# Patient Record
Sex: FEMALE | Race: BLACK OR AFRICAN AMERICAN | URBAN - METROPOLITAN AREA
[De-identification: names, ages, dates, MRNs, and addresses within clinical notes are randomized per-mention and may not be internally consistent; named-entity substitution may affect disease eponyms.]

---

## 2017-01-09 RX ORDER — PRAVASTATIN SODIUM 40 MG
TABLET ORAL
Qty: 90 TABLET | Refills: 0 | Status: SHIPPED | OUTPATIENT
Start: 2017-01-09 | End: 2017-01-10 | Stop reason: SDUPTHER

## 2017-01-10 ENCOUNTER — OFFICE VISIT (OUTPATIENT)
Dept: INTERNAL MEDICINE CLINIC | Age: 82
End: 2017-01-10

## 2017-01-10 VITALS
TEMPERATURE: 98.1 F | BODY MASS INDEX: 31.56 KG/M2 | WEIGHT: 189.4 LBS | SYSTOLIC BLOOD PRESSURE: 148 MMHG | DIASTOLIC BLOOD PRESSURE: 68 MMHG | HEIGHT: 65 IN | HEART RATE: 64 BPM

## 2017-01-10 DIAGNOSIS — E21.3 HYPERPARATHYROIDISM (HCC): ICD-10-CM

## 2017-01-10 DIAGNOSIS — E78.5 HYPERLIPIDEMIA, UNSPECIFIED HYPERLIPIDEMIA TYPE: ICD-10-CM

## 2017-01-10 DIAGNOSIS — I10 ESSENTIAL HYPERTENSION: Primary | ICD-10-CM

## 2017-01-10 DIAGNOSIS — K63.5 COLON POLYP: ICD-10-CM

## 2017-01-10 PROCEDURE — 99213 OFFICE O/P EST LOW 20 MIN: CPT | Performed by: INTERNAL MEDICINE

## 2017-01-10 RX ORDER — PRAVASTATIN SODIUM 40 MG
40 TABLET ORAL DAILY
Qty: 90 TABLET | Refills: 2 | Status: SHIPPED | OUTPATIENT
Start: 2017-01-10 | End: 2017-09-26 | Stop reason: SDUPTHER

## 2017-01-10 RX ORDER — ALPRAZOLAM 0.5 MG/1
0.25 TABLET ORAL NIGHTLY PRN
Qty: 15 TABLET | Refills: 0 | Status: SHIPPED | OUTPATIENT
Start: 2017-01-10 | End: 2018-06-13 | Stop reason: SDUPTHER

## 2017-01-10 ASSESSMENT — ENCOUNTER SYMPTOMS
DIARRHEA: 0
ABDOMINAL DISTENTION: 0
ABDOMINAL PAIN: 0
COUGH: 0
CHEST TIGHTNESS: 0
CONSTIPATION: 0
WHEEZING: 0

## 2017-01-11 LAB
A/G RATIO: 1.2 (ref 1.1–2.2)
ALBUMIN SERPL-MCNC: 4.1 G/DL (ref 3.4–5)
ALP BLD-CCNC: 119 U/L (ref 40–129)
ALT SERPL-CCNC: 10 U/L (ref 10–40)
ANION GAP SERPL CALCULATED.3IONS-SCNC: 15 MMOL/L (ref 3–16)
AST SERPL-CCNC: 13 U/L (ref 15–37)
BILIRUB SERPL-MCNC: 0.4 MG/DL (ref 0–1)
BUN BLDV-MCNC: 19 MG/DL (ref 7–20)
CALCIUM SERPL-MCNC: 10.5 MG/DL (ref 8.3–10.6)
CHLORIDE BLD-SCNC: 102 MMOL/L (ref 99–110)
CHOLESTEROL, TOTAL: 212 MG/DL (ref 0–199)
CO2: 24 MMOL/L (ref 21–32)
CREAT SERPL-MCNC: 1 MG/DL (ref 0.6–1.2)
GFR AFRICAN AMERICAN: >60
GFR NON-AFRICAN AMERICAN: 53
GLOBULIN: 3.3 G/DL
GLUCOSE BLD-MCNC: 84 MG/DL (ref 70–99)
HDLC SERPL-MCNC: 78 MG/DL (ref 40–60)
LDL CHOLESTEROL CALCULATED: 117 MG/DL
PARATHYROID HORMONE INTACT: 121.5 PG/ML (ref 14–72)
POTASSIUM SERPL-SCNC: 4.9 MMOL/L (ref 3.5–5.1)
SODIUM BLD-SCNC: 141 MMOL/L (ref 136–145)
TOTAL PROTEIN: 7.4 G/DL (ref 6.4–8.2)
TRIGL SERPL-MCNC: 85 MG/DL (ref 0–150)
VLDLC SERPL CALC-MCNC: 17 MG/DL

## 2017-01-24 RX ORDER — OLMESARTAN MEDOXOMIL 40 MG/1
TABLET, FILM COATED ORAL
Qty: 90 TABLET | Refills: 0 | Status: SHIPPED | OUTPATIENT
Start: 2017-01-24 | End: 2017-04-17 | Stop reason: SDUPTHER

## 2017-02-14 RX ORDER — ATENOLOL 25 MG/1
TABLET ORAL
Qty: 180 TABLET | Refills: 0 | Status: SHIPPED | OUTPATIENT
Start: 2017-02-14 | End: 2017-05-16 | Stop reason: SDUPTHER

## 2017-04-17 RX ORDER — OLMESARTAN MEDOXOMIL 40 MG/1
TABLET, FILM COATED ORAL
Qty: 90 TABLET | Refills: 0 | Status: SHIPPED | OUTPATIENT
Start: 2017-04-17 | End: 2017-07-18 | Stop reason: SDUPTHER

## 2017-05-16 RX ORDER — ATENOLOL 25 MG/1
TABLET ORAL
Qty: 180 TABLET | Refills: 0 | Status: SHIPPED | OUTPATIENT
Start: 2017-05-16 | End: 2017-08-15 | Stop reason: SDUPTHER

## 2017-07-18 RX ORDER — OLMESARTAN MEDOXOMIL 40 MG/1
TABLET, FILM COATED ORAL
Qty: 90 TABLET | Refills: 0 | Status: SHIPPED | OUTPATIENT
Start: 2017-07-18 | End: 2017-10-10 | Stop reason: SDUPTHER

## 2017-08-15 ENCOUNTER — TELEPHONE (OUTPATIENT)
Dept: INTERNAL MEDICINE CLINIC | Age: 82
End: 2017-08-15

## 2017-08-15 RX ORDER — ATENOLOL 25 MG/1
TABLET ORAL
Qty: 180 TABLET | Refills: 0 | Status: SHIPPED | OUTPATIENT
Start: 2017-08-15 | End: 2018-05-07 | Stop reason: ALTCHOICE

## 2017-08-16 RX ORDER — METOPROLOL TARTRATE 50 MG/1
50 TABLET, FILM COATED ORAL 2 TIMES DAILY
Qty: 60 TABLET | Refills: 1 | Status: SHIPPED | OUTPATIENT
Start: 2017-08-16 | End: 2017-11-07 | Stop reason: SDUPTHER

## 2017-10-10 RX ORDER — OLMESARTAN MEDOXOMIL 40 MG/1
TABLET, FILM COATED ORAL
Qty: 90 TABLET | Refills: 0 | Status: SHIPPED | OUTPATIENT
Start: 2017-10-10 | End: 2018-01-08 | Stop reason: SDUPTHER

## 2017-10-24 RX ORDER — PRAVASTATIN SODIUM 40 MG
TABLET ORAL
Qty: 90 TABLET | Refills: 0 | Status: SHIPPED | OUTPATIENT
Start: 2017-10-24 | End: 2018-01-18 | Stop reason: SDUPTHER

## 2017-11-07 RX ORDER — METOPROLOL TARTRATE 50 MG/1
TABLET, FILM COATED ORAL
Qty: 60 TABLET | Refills: 0 | Status: SHIPPED | OUTPATIENT
Start: 2017-11-07 | End: 2017-12-05 | Stop reason: SDUPTHER

## 2017-12-05 RX ORDER — METOPROLOL TARTRATE 50 MG/1
TABLET, FILM COATED ORAL
Qty: 60 TABLET | Refills: 0 | Status: SHIPPED | OUTPATIENT
Start: 2017-12-05 | End: 2018-01-08 | Stop reason: SDUPTHER

## 2018-01-08 RX ORDER — METOPROLOL TARTRATE 50 MG/1
TABLET, FILM COATED ORAL
Qty: 60 TABLET | Refills: 0 | Status: SHIPPED | OUTPATIENT
Start: 2018-01-08 | End: 2018-02-07 | Stop reason: SDUPTHER

## 2018-01-18 RX ORDER — PRAVASTATIN SODIUM 40 MG
TABLET ORAL
Qty: 90 TABLET | Refills: 0 | Status: SHIPPED | OUTPATIENT
Start: 2018-01-18 | End: 2018-05-07 | Stop reason: SDUPTHER

## 2018-03-06 RX ORDER — METOPROLOL TARTRATE 50 MG/1
TABLET, FILM COATED ORAL
Qty: 60 TABLET | Refills: 2 | Status: SHIPPED | OUTPATIENT
Start: 2018-03-06 | End: 2018-05-07 | Stop reason: SDUPTHER

## 2018-04-10 RX ORDER — OLMESARTAN MEDOXOMIL 40 MG/1
TABLET, FILM COATED ORAL
Qty: 30 TABLET | Refills: 0 | Status: SHIPPED | OUTPATIENT
Start: 2018-04-10 | End: 2018-05-07 | Stop reason: SDUPTHER

## 2018-05-01 ENCOUNTER — TELEPHONE (OUTPATIENT)
Dept: INTERNAL MEDICINE CLINIC | Age: 83
End: 2018-05-01

## 2018-05-07 ENCOUNTER — OFFICE VISIT (OUTPATIENT)
Dept: INTERNAL MEDICINE CLINIC | Age: 83
End: 2018-05-07

## 2018-05-07 VITALS
DIASTOLIC BLOOD PRESSURE: 72 MMHG | TEMPERATURE: 98.1 F | HEART RATE: 67 BPM | BODY MASS INDEX: 32.26 KG/M2 | HEIGHT: 65 IN | SYSTOLIC BLOOD PRESSURE: 150 MMHG | WEIGHT: 193.6 LBS

## 2018-05-07 DIAGNOSIS — E21.3 HYPERPARATHYROIDISM (HCC): ICD-10-CM

## 2018-05-07 DIAGNOSIS — K63.5 POLYP OF COLON, UNSPECIFIED PART OF COLON, UNSPECIFIED TYPE: ICD-10-CM

## 2018-05-07 DIAGNOSIS — I10 ESSENTIAL HYPERTENSION: Primary | ICD-10-CM

## 2018-05-07 DIAGNOSIS — E78.5 HYPERLIPIDEMIA, UNSPECIFIED HYPERLIPIDEMIA TYPE: ICD-10-CM

## 2018-05-07 DIAGNOSIS — R07.89 CHEST DISCOMFORT: ICD-10-CM

## 2018-05-07 LAB
A/G RATIO: 1.1 (ref 1.1–2.2)
ALBUMIN SERPL-MCNC: 4.1 G/DL (ref 3.4–5)
ALP BLD-CCNC: 105 U/L (ref 40–129)
ALT SERPL-CCNC: 10 U/L (ref 10–40)
ANION GAP SERPL CALCULATED.3IONS-SCNC: 17 MMOL/L (ref 3–16)
AST SERPL-CCNC: 13 U/L (ref 15–37)
BASOPHILS ABSOLUTE: 0 K/UL (ref 0–0.2)
BASOPHILS RELATIVE PERCENT: 0.7 %
BILIRUB SERPL-MCNC: 0.4 MG/DL (ref 0–1)
BUN BLDV-MCNC: 17 MG/DL (ref 7–20)
CALCIUM SERPL-MCNC: 10 MG/DL (ref 8.3–10.6)
CHLORIDE BLD-SCNC: 104 MMOL/L (ref 99–110)
CHOLESTEROL, TOTAL: 193 MG/DL (ref 0–199)
CO2: 22 MMOL/L (ref 21–32)
CREAT SERPL-MCNC: 0.9 MG/DL (ref 0.6–1.2)
EOSINOPHILS ABSOLUTE: 0.3 K/UL (ref 0–0.6)
EOSINOPHILS RELATIVE PERCENT: 5.4 %
GFR AFRICAN AMERICAN: >60
GFR NON-AFRICAN AMERICAN: 59
GLOBULIN: 3.8 G/DL
GLUCOSE BLD-MCNC: 78 MG/DL (ref 70–99)
HCT VFR BLD CALC: 40.1 % (ref 36–48)
HDLC SERPL-MCNC: 65 MG/DL (ref 40–60)
HEMOGLOBIN: 13.3 G/DL (ref 12–16)
LDL CHOLESTEROL CALCULATED: 104 MG/DL
LYMPHOCYTES ABSOLUTE: 1.4 K/UL (ref 1–5.1)
LYMPHOCYTES RELATIVE PERCENT: 21.3 %
MCH RBC QN AUTO: 28.8 PG (ref 26–34)
MCHC RBC AUTO-ENTMCNC: 33.2 G/DL (ref 31–36)
MCV RBC AUTO: 86.7 FL (ref 80–100)
MONOCYTES ABSOLUTE: 0.5 K/UL (ref 0–1.3)
MONOCYTES RELATIVE PERCENT: 7.8 %
NEUTROPHILS ABSOLUTE: 4.2 K/UL (ref 1.7–7.7)
NEUTROPHILS RELATIVE PERCENT: 64.8 %
PARATHYROID HORMONE INTACT: 148.9 PG/ML (ref 14–72)
PDW BLD-RTO: 13.8 % (ref 12.4–15.4)
PLATELET # BLD: 328 K/UL (ref 135–450)
PMV BLD AUTO: 8.7 FL (ref 5–10.5)
POTASSIUM SERPL-SCNC: 4.6 MMOL/L (ref 3.5–5.1)
RBC # BLD: 4.63 M/UL (ref 4–5.2)
SODIUM BLD-SCNC: 143 MMOL/L (ref 136–145)
TOTAL PROTEIN: 7.9 G/DL (ref 6.4–8.2)
TRIGL SERPL-MCNC: 118 MG/DL (ref 0–150)
VLDLC SERPL CALC-MCNC: 24 MG/DL
WBC # BLD: 6.4 K/UL (ref 4–11)

## 2018-05-07 PROCEDURE — 93000 ELECTROCARDIOGRAM COMPLETE: CPT | Performed by: INTERNAL MEDICINE

## 2018-05-07 PROCEDURE — G8399 PT W/DXA RESULTS DOCUMENT: HCPCS | Performed by: INTERNAL MEDICINE

## 2018-05-07 PROCEDURE — 99214 OFFICE O/P EST MOD 30 MIN: CPT | Performed by: INTERNAL MEDICINE

## 2018-05-07 PROCEDURE — G8427 DOCREV CUR MEDS BY ELIG CLIN: HCPCS | Performed by: INTERNAL MEDICINE

## 2018-05-07 PROCEDURE — 1090F PRES/ABSN URINE INCON ASSESS: CPT | Performed by: INTERNAL MEDICINE

## 2018-05-07 PROCEDURE — 1123F ACP DISCUSS/DSCN MKR DOCD: CPT | Performed by: INTERNAL MEDICINE

## 2018-05-07 PROCEDURE — G8417 CALC BMI ABV UP PARAM F/U: HCPCS | Performed by: INTERNAL MEDICINE

## 2018-05-07 PROCEDURE — 4040F PNEUMOC VAC/ADMIN/RCVD: CPT | Performed by: INTERNAL MEDICINE

## 2018-05-07 PROCEDURE — 1036F TOBACCO NON-USER: CPT | Performed by: INTERNAL MEDICINE

## 2018-05-07 RX ORDER — OLMESARTAN MEDOXOMIL 40 MG/1
TABLET ORAL
Qty: 30 TABLET | Refills: 3 | Status: SHIPPED | OUTPATIENT
Start: 2018-05-07 | End: 2018-09-04 | Stop reason: SDUPTHER

## 2018-05-07 RX ORDER — METOPROLOL TARTRATE 50 MG/1
TABLET, FILM COATED ORAL
Qty: 60 TABLET | Refills: 5 | Status: SHIPPED | OUTPATIENT
Start: 2018-05-07 | End: 2018-09-04 | Stop reason: SDUPTHER

## 2018-05-07 RX ORDER — PRAVASTATIN SODIUM 40 MG
TABLET ORAL
Qty: 90 TABLET | Refills: 0 | Status: SHIPPED | OUTPATIENT
Start: 2018-05-07 | End: 2019-02-20 | Stop reason: SDUPTHER

## 2018-05-07 ASSESSMENT — ENCOUNTER SYMPTOMS
ABDOMINAL DISTENTION: 0
WHEEZING: 0
CHEST TIGHTNESS: 1
COUGH: 0
SHORTNESS OF BREATH: 0
ABDOMINAL PAIN: 0

## 2018-05-10 ENCOUNTER — TELEPHONE (OUTPATIENT)
Dept: INTERNAL MEDICINE CLINIC | Age: 83
End: 2018-05-10

## 2018-05-10 DIAGNOSIS — E78.5 HYPERLIPIDEMIA, UNSPECIFIED HYPERLIPIDEMIA TYPE: ICD-10-CM

## 2018-05-10 DIAGNOSIS — I10 ESSENTIAL HYPERTENSION: ICD-10-CM

## 2018-05-10 DIAGNOSIS — R07.9 CHEST PAIN, UNSPECIFIED TYPE: Primary | ICD-10-CM

## 2018-05-21 ENCOUNTER — HOSPITAL ENCOUNTER (OUTPATIENT)
Dept: NON INVASIVE DIAGNOSTICS | Age: 83
Discharge: HOME | End: 2018-05-21
Attending: INTERNAL MEDICINE | Admitting: INTERNAL MEDICINE

## 2018-05-21 DIAGNOSIS — R07.9 CHEST PAIN: ICD-10-CM

## 2018-05-21 LAB
LV EF: 80 %
LVEF MODALITY: NORMAL

## 2018-06-01 ENCOUNTER — TELEPHONE (OUTPATIENT)
Dept: INTERNAL MEDICINE CLINIC | Age: 83
End: 2018-06-01

## 2018-06-04 ENCOUNTER — TELEPHONE (OUTPATIENT)
Dept: INTERNAL MEDICINE CLINIC | Age: 83
End: 2018-06-04

## 2018-06-13 ENCOUNTER — OFFICE VISIT (OUTPATIENT)
Dept: INTERNAL MEDICINE CLINIC | Age: 83
End: 2018-06-13

## 2018-06-13 ENCOUNTER — TELEPHONE (OUTPATIENT)
Dept: INTERNAL MEDICINE CLINIC | Age: 83
End: 2018-06-13

## 2018-06-13 VITALS
DIASTOLIC BLOOD PRESSURE: 67 MMHG | SYSTOLIC BLOOD PRESSURE: 148 MMHG | BODY MASS INDEX: 31.79 KG/M2 | TEMPERATURE: 98.3 F | WEIGHT: 190.8 LBS | HEART RATE: 80 BPM | HEIGHT: 65 IN

## 2018-06-13 DIAGNOSIS — M10.9 GOUT OF LEFT ANKLE, UNSPECIFIED CAUSE, UNSPECIFIED CHRONICITY: ICD-10-CM

## 2018-06-13 DIAGNOSIS — I10 ESSENTIAL HYPERTENSION: Primary | ICD-10-CM

## 2018-06-13 DIAGNOSIS — E21.3 HYPERPARATHYROIDISM (HCC): ICD-10-CM

## 2018-06-13 DIAGNOSIS — G47.00 INSOMNIA, UNSPECIFIED TYPE: ICD-10-CM

## 2018-06-13 DIAGNOSIS — K63.5 POLYP OF COLON, UNSPECIFIED PART OF COLON, UNSPECIFIED TYPE: ICD-10-CM

## 2018-06-13 DIAGNOSIS — E78.5 HYPERLIPIDEMIA, UNSPECIFIED HYPERLIPIDEMIA TYPE: ICD-10-CM

## 2018-06-13 LAB
A/G RATIO: 1.2 (ref 1.1–2.2)
ALBUMIN SERPL-MCNC: 4.3 G/DL (ref 3.4–5)
ALP BLD-CCNC: 105 U/L (ref 40–129)
ALT SERPL-CCNC: 10 U/L (ref 10–40)
ANION GAP SERPL CALCULATED.3IONS-SCNC: 17 MMOL/L (ref 3–16)
AST SERPL-CCNC: 15 U/L (ref 15–37)
BILIRUB SERPL-MCNC: 0.3 MG/DL (ref 0–1)
BUN BLDV-MCNC: 20 MG/DL (ref 7–20)
CALCIUM SERPL-MCNC: 10.7 MG/DL (ref 8.3–10.6)
CHLORIDE BLD-SCNC: 102 MMOL/L (ref 99–110)
CO2: 21 MMOL/L (ref 21–32)
CREAT SERPL-MCNC: 1 MG/DL (ref 0.6–1.2)
GFR AFRICAN AMERICAN: >60
GFR NON-AFRICAN AMERICAN: 53
GLOBULIN: 3.6 G/DL
GLUCOSE BLD-MCNC: 89 MG/DL (ref 70–99)
POTASSIUM SERPL-SCNC: 4.6 MMOL/L (ref 3.5–5.1)
SODIUM BLD-SCNC: 140 MMOL/L (ref 136–145)
TOTAL PROTEIN: 7.9 G/DL (ref 6.4–8.2)
URIC ACID, SERUM: 7.4 MG/DL (ref 2.6–6)

## 2018-06-13 PROCEDURE — G8427 DOCREV CUR MEDS BY ELIG CLIN: HCPCS | Performed by: INTERNAL MEDICINE

## 2018-06-13 PROCEDURE — 1036F TOBACCO NON-USER: CPT | Performed by: INTERNAL MEDICINE

## 2018-06-13 PROCEDURE — G8399 PT W/DXA RESULTS DOCUMENT: HCPCS | Performed by: INTERNAL MEDICINE

## 2018-06-13 PROCEDURE — G8510 SCR DEP NEG, NO PLAN REQD: HCPCS | Performed by: INTERNAL MEDICINE

## 2018-06-13 PROCEDURE — 3288F FALL RISK ASSESSMENT DOCD: CPT | Performed by: INTERNAL MEDICINE

## 2018-06-13 PROCEDURE — 99214 OFFICE O/P EST MOD 30 MIN: CPT | Performed by: INTERNAL MEDICINE

## 2018-06-13 PROCEDURE — 4040F PNEUMOC VAC/ADMIN/RCVD: CPT | Performed by: INTERNAL MEDICINE

## 2018-06-13 PROCEDURE — 1090F PRES/ABSN URINE INCON ASSESS: CPT | Performed by: INTERNAL MEDICINE

## 2018-06-13 PROCEDURE — G8417 CALC BMI ABV UP PARAM F/U: HCPCS | Performed by: INTERNAL MEDICINE

## 2018-06-13 PROCEDURE — 1123F ACP DISCUSS/DSCN MKR DOCD: CPT | Performed by: INTERNAL MEDICINE

## 2018-06-13 RX ORDER — ALPRAZOLAM 0.5 MG/1
0.25 TABLET ORAL NIGHTLY PRN
Qty: 15 TABLET | Refills: 0 | Status: SHIPPED | OUTPATIENT
Start: 2018-06-13 | End: 2018-11-05 | Stop reason: SDUPTHER

## 2018-06-13 ASSESSMENT — PATIENT HEALTH QUESTIONNAIRE - PHQ9
1. LITTLE INTEREST OR PLEASURE IN DOING THINGS: 0
SUM OF ALL RESPONSES TO PHQ9 QUESTIONS 1 & 2: 0
2. FEELING DOWN, DEPRESSED OR HOPELESS: 0
SUM OF ALL RESPONSES TO PHQ QUESTIONS 1-9: 0

## 2018-06-13 ASSESSMENT — ENCOUNTER SYMPTOMS
CHEST TIGHTNESS: 0
COUGH: 0
WHEEZING: 0

## 2018-06-14 PROBLEM — M10.9 ACUTE GOUT OF LEFT ANKLE: Status: ACTIVE | Noted: 2018-06-14

## 2018-06-14 RX ORDER — ALLOPURINOL 100 MG/1
100 TABLET ORAL DAILY
Qty: 30 TABLET | Refills: 3 | Status: SHIPPED | OUTPATIENT
Start: 2018-06-14 | End: 2018-11-05

## 2018-08-20 RX ORDER — PRAVASTATIN SODIUM 40 MG
TABLET ORAL
Qty: 90 TABLET | Refills: 1 | Status: SHIPPED | OUTPATIENT
Start: 2018-08-20 | End: 2018-11-05 | Stop reason: SDUPTHER

## 2018-09-04 RX ORDER — OLMESARTAN MEDOXOMIL 40 MG/1
TABLET ORAL
Qty: 90 TABLET | Refills: 0 | Status: SHIPPED | OUTPATIENT
Start: 2018-09-04 | End: 2018-10-02 | Stop reason: SDUPTHER

## 2018-09-04 RX ORDER — METOPROLOL TARTRATE 50 MG/1
TABLET, FILM COATED ORAL
Qty: 180 TABLET | Refills: 3 | Status: SHIPPED | OUTPATIENT
Start: 2018-09-04 | End: 2019-05-31 | Stop reason: SDUPTHER

## 2018-10-02 RX ORDER — OLMESARTAN MEDOXOMIL 40 MG/1
TABLET ORAL
Qty: 90 TABLET | Refills: 0 | Status: SHIPPED | OUTPATIENT
Start: 2018-10-02 | End: 2019-02-28 | Stop reason: SDUPTHER

## 2018-11-05 ENCOUNTER — OFFICE VISIT (OUTPATIENT)
Dept: PRIMARY CARE CLINIC | Age: 83
End: 2018-11-05

## 2018-11-05 VITALS
HEIGHT: 65 IN | BODY MASS INDEX: 32.49 KG/M2 | SYSTOLIC BLOOD PRESSURE: 160 MMHG | DIASTOLIC BLOOD PRESSURE: 62 MMHG | OXYGEN SATURATION: 98 % | RESPIRATION RATE: 14 BRPM | HEART RATE: 63 BPM | WEIGHT: 195 LBS

## 2018-11-05 DIAGNOSIS — I10 ESSENTIAL HYPERTENSION: ICD-10-CM

## 2018-11-05 DIAGNOSIS — E21.3 HYPERPARATHYROIDISM (HCC): ICD-10-CM

## 2018-11-05 DIAGNOSIS — E78.5 HYPERLIPIDEMIA, UNSPECIFIED HYPERLIPIDEMIA TYPE: ICD-10-CM

## 2018-11-05 DIAGNOSIS — K63.5 POLYP OF COLON, UNSPECIFIED PART OF COLON, UNSPECIFIED TYPE: ICD-10-CM

## 2018-11-05 DIAGNOSIS — Z23 NEED FOR PROPHYLACTIC VACCINATION AND INOCULATION AGAINST VARICELLA: ICD-10-CM

## 2018-11-05 DIAGNOSIS — G47.00 INSOMNIA, UNSPECIFIED TYPE: ICD-10-CM

## 2018-11-05 DIAGNOSIS — I10 ESSENTIAL HYPERTENSION: Primary | ICD-10-CM

## 2018-11-05 LAB
ANION GAP SERPL CALCULATED.3IONS-SCNC: 15 MMOL/L (ref 3–16)
BUN BLDV-MCNC: 18 MG/DL (ref 7–20)
CALCIUM SERPL-MCNC: 10.4 MG/DL (ref 8.3–10.6)
CHLORIDE BLD-SCNC: 105 MMOL/L (ref 99–110)
CO2: 21 MMOL/L (ref 21–32)
CREAT SERPL-MCNC: 0.9 MG/DL (ref 0.6–1.2)
GFR AFRICAN AMERICAN: >60
GFR NON-AFRICAN AMERICAN: 59
GLUCOSE BLD-MCNC: 92 MG/DL (ref 70–99)
PARATHYROID HORMONE INTACT: 168.8 PG/ML (ref 14–72)
POTASSIUM SERPL-SCNC: 4.2 MMOL/L (ref 3.5–5.1)
SODIUM BLD-SCNC: 141 MMOL/L (ref 136–145)
URIC ACID, SERUM: 7.2 MG/DL (ref 2.6–6)

## 2018-11-05 PROCEDURE — 99213 OFFICE O/P EST LOW 20 MIN: CPT | Performed by: INTERNAL MEDICINE

## 2018-11-05 RX ORDER — HYDROCHLOROTHIAZIDE 12.5 MG/1
12.5 CAPSULE, GELATIN COATED ORAL DAILY
Qty: 90 CAPSULE | Refills: 1 | Status: SHIPPED | OUTPATIENT
Start: 2018-11-05 | End: 2019-05-31 | Stop reason: SDUPTHER

## 2018-11-05 RX ORDER — ALPRAZOLAM 0.5 MG/1
0.25 TABLET ORAL NIGHTLY PRN
Qty: 15 TABLET | Refills: 0 | Status: SHIPPED | OUTPATIENT
Start: 2018-11-05 | End: 2019-05-31 | Stop reason: SDUPTHER

## 2018-11-05 ASSESSMENT — ENCOUNTER SYMPTOMS
WHEEZING: 0
ABDOMINAL DISTENTION: 0
ABDOMINAL PAIN: 0
SHORTNESS OF BREATH: 0
CHEST TIGHTNESS: 0

## 2018-11-05 NOTE — PATIENT INSTRUCTIONS
CALL WITH BLOOD PRESSURE READINGS  IF YOU DO NOT HEAR FROM GI IN THE NEXT 2 WEEKS CONTACT THEM REGARDING COLONOSCOPY

## 2018-11-05 NOTE — PROGRESS NOTES
Subjective:      Patient ID: Boris Goldberg is a 80 y.o. female. HPI  Here for follow up  She has not been taking the allopurinol and also the colchicine   She would take a small amount of alprazolam at bedtime on rare occasional insomnia   She would break a pill of 0.5 in half  No falls with this medication  She checks blood pressure at home  The highest is 150   She also gets some 160 readings     Prior to Visit Medications    Medication Sig Taking? Authorizing Provider   olmesartan (BENICAR) 40 MG tablet TAKE 1 TABLET BY MOUTH EVERY DAY Yes Andrea Novak MD   metoprolol tartrate (LOPRESSOR) 50 MG tablet TAKE 1 TABLET BY MOUTH TWICE DAILY Yes Andrea Novak MD   pravastatin (PRAVACHOL) 40 MG tablet TAKE ONE TABLET BY MOUTH ONCE DAILY Yes Andrea Novak MD   indomethacin (INDOCIN) 50 MG capsule Take 1 capsule by mouth 2 times daily (with meals) for 5 days  Julia Hurst, APRN - CNP         Review of Systems   Respiratory: Negative for chest tightness, shortness of breath and wheezing. Cardiovascular: Negative for chest pain. Gastrointestinal: Negative for abdominal distention and abdominal pain. Objective:   Physical Exam   Constitutional: She is oriented to person, place, and time. She appears well-developed and well-nourished. Neck: No thyromegaly present. Cardiovascular: Normal rate, regular rhythm, normal heart sounds and intact distal pulses. Exam reveals no gallop and no friction rub. No murmur heard. Pulmonary/Chest: Effort normal. No respiratory distress. She has no wheezes. She has no rales. Abdominal: Soft. Bowel sounds are normal. She exhibits no distension. There is no tenderness. There is no rebound. Musculoskeletal: She exhibits no edema. Lymphadenopathy:     She has no cervical adenopathy. Neurological: She is alert and oriented to person, place, and time. Skin: Skin is warm and dry. Assessment:          Diagnosis Orders   1.  Essential

## 2019-02-21 RX ORDER — PRAVASTATIN SODIUM 40 MG
TABLET ORAL
Qty: 90 TABLET | Refills: 0 | Status: SHIPPED | OUTPATIENT
Start: 2019-02-21 | End: 2019-02-28 | Stop reason: SDUPTHER

## 2019-02-28 ENCOUNTER — TELEPHONE (OUTPATIENT)
Dept: PRIMARY CARE CLINIC | Age: 84
End: 2019-02-28

## 2019-02-28 RX ORDER — PRAVASTATIN SODIUM 40 MG
TABLET ORAL
Qty: 30 TABLET | Refills: 0 | Status: SHIPPED | OUTPATIENT
Start: 2019-02-28 | End: 2019-05-31 | Stop reason: SDUPTHER

## 2019-02-28 RX ORDER — OLMESARTAN MEDOXOMIL 40 MG/1
TABLET ORAL
Qty: 30 TABLET | Refills: 0 | Status: SHIPPED | OUTPATIENT
Start: 2019-02-28 | End: 2019-05-31 | Stop reason: SDUPTHER

## 2019-05-31 ENCOUNTER — OFFICE VISIT (OUTPATIENT)
Dept: PRIMARY CARE CLINIC | Age: 84
End: 2019-05-31
Payer: MEDICARE

## 2019-05-31 VITALS
WEIGHT: 196.2 LBS | TEMPERATURE: 97.3 F | BODY MASS INDEX: 32.65 KG/M2 | SYSTOLIC BLOOD PRESSURE: 116 MMHG | DIASTOLIC BLOOD PRESSURE: 64 MMHG | HEART RATE: 72 BPM

## 2019-05-31 DIAGNOSIS — I10 ESSENTIAL HYPERTENSION: Primary | ICD-10-CM

## 2019-05-31 DIAGNOSIS — G47.00 INSOMNIA, UNSPECIFIED TYPE: ICD-10-CM

## 2019-05-31 DIAGNOSIS — E78.5 HYPERLIPIDEMIA, UNSPECIFIED HYPERLIPIDEMIA TYPE: ICD-10-CM

## 2019-05-31 PROCEDURE — 4040F PNEUMOC VAC/ADMIN/RCVD: CPT | Performed by: INTERNAL MEDICINE

## 2019-05-31 PROCEDURE — 1036F TOBACCO NON-USER: CPT | Performed by: INTERNAL MEDICINE

## 2019-05-31 PROCEDURE — 1123F ACP DISCUSS/DSCN MKR DOCD: CPT | Performed by: INTERNAL MEDICINE

## 2019-05-31 PROCEDURE — 1090F PRES/ABSN URINE INCON ASSESS: CPT | Performed by: INTERNAL MEDICINE

## 2019-05-31 PROCEDURE — G8428 CUR MEDS NOT DOCUMENT: HCPCS | Performed by: INTERNAL MEDICINE

## 2019-05-31 PROCEDURE — G8417 CALC BMI ABV UP PARAM F/U: HCPCS | Performed by: INTERNAL MEDICINE

## 2019-05-31 PROCEDURE — 99214 OFFICE O/P EST MOD 30 MIN: CPT | Performed by: INTERNAL MEDICINE

## 2019-05-31 RX ORDER — OLMESARTAN MEDOXOMIL 40 MG/1
TABLET ORAL
Qty: 90 TABLET | Refills: 1 | Status: SHIPPED | OUTPATIENT
Start: 2019-05-31 | End: 2019-09-07 | Stop reason: SDUPTHER

## 2019-05-31 RX ORDER — METOPROLOL TARTRATE 50 MG/1
TABLET, FILM COATED ORAL
Qty: 180 TABLET | Refills: 1 | Status: SHIPPED | OUTPATIENT
Start: 2019-05-31 | End: 2019-11-22 | Stop reason: SDUPTHER

## 2019-05-31 RX ORDER — PRAVASTATIN SODIUM 40 MG
TABLET ORAL
Qty: 90 TABLET | Refills: 1 | Status: SHIPPED | OUTPATIENT
Start: 2019-05-31 | End: 2019-12-01 | Stop reason: SDUPTHER

## 2019-05-31 RX ORDER — ALPRAZOLAM 0.5 MG/1
0.25 TABLET ORAL NIGHTLY PRN
Qty: 15 TABLET | Refills: 0 | Status: SHIPPED | OUTPATIENT
Start: 2019-05-31 | End: 2019-06-30

## 2019-05-31 RX ORDER — HYDROCHLOROTHIAZIDE 12.5 MG/1
12.5 CAPSULE, GELATIN COATED ORAL DAILY
Qty: 90 CAPSULE | Refills: 1 | Status: SHIPPED | OUTPATIENT
Start: 2019-05-31 | End: 2020-01-16 | Stop reason: SDUPTHER

## 2019-05-31 NOTE — PROGRESS NOTES
Lokesh Villarreal  YOB: 1932    Date of Service:  5/31/2019    Chief Complaint:   Lokesh Villarreal is a 80 y.o. female who presents for chronic health problems. HPI: The patient is here for comprehensive preventative evaluation and evaluation of ongoing medical problems. The patient has been instructed in the benefits of a healthy diet, regular exercise and ideal body weight.       Hypertension : stable patient is on  Metoprolol, HCTZ, Benicar  . Patient denies any side effects of medication. Patient is not monitoring  bp at home. Encouraged to monitor bp at home. Insomnia : she is taking xanax 2 two in a week. Hyperlipidemia : stable on lipitor. patient denies any side effects of medication . Patient denies any active complaints today. Review of Systems:  Constitutional: Negative for chills, fever, malaise/fatigue and weight loss. HENT: Negative for headaches, ear discharge, ear pain, hearing loss, sore throat, blurry vision and double vision. Respiratory: Negative for cough,  sputum production, hemoptysis, shortness of breath and wheezing. Cardiovascular: Negative for chest pain, palpitations, orthopnea, claudication and leg swelling. Gastrointestinal: Negative for abdominal pain, nausea and vomiting, constipation, diarrhea, heartburn, blood in stool, melena. Genitourinary: Negative for dysuria, flank pain, frequency, hematuria and urgency. Musculoskeletal: Negative for back pain, myalgias and neck pain. Knee pain. Shoulder pain. Neurological: Negative for dizziness, seizure, sensory change, focal weakness, loss of consciousness . Psychiatric/Behavioral: Negative for depression and substance abuse. The patient does not have insomnia. Skin: Rash, skin lesion, itching, acne, numerous moles.      Vitals:    05/31/19 1150   BP: 116/64   Pulse: 72   Temp: 97.3 °F (36.3 °C)     Wt Readings from Last 3 Encounters:   05/31/19 196 lb 3.2 oz (89 kg)   11/05/18 195 lb (88.5 kg)   06/13/18 190 lb 12.8 oz (86.5 kg)     BP Readings from Last 3 Encounters:   05/31/19 116/64   11/05/18 (!) 160/62   06/13/18 (!) 148/67         Physical Exam:  Constitutional:   obese lady. No distress. HENT:   Head: Normocephalic. Right Ear: External ear normal.   Left Ear: External ear normal.   Mouth/Throat: Oropharynx is clear and moist. No oropharyngeal exudate. Eyes: Conjunctivae and EOM are normal. Pupils are equal, round, and reactive to light. Right eye exhibits no discharge. Left eye exhibits no discharge. No scleral icterus. Neck: Neck supple. No JVD present. No thyromegaly present. Cardiovascular:  Normal rate, regular rhythm, normal heart sounds and intact distal pulses. Exam reveals no gallop. No murmur heard. no edema. Chest: Effort normal and breath sounds normal. no wheezes. has no rales. Abdominal: Soft, Bowel sounds are normal.  exhibits no distension and no mass. No organomegaly  There is no tenderness. There is no guarding. Musculoskeletal: Normal range of motion in all extremities. Lymphadenopathy: No cervical adenopathy. Neurological:  exhibits normal muscle tone. No sensory or motor deficit, Coordination normal, normal reflexes. Skin: Skin is warm. No rash . No Erythema. Psychiatric:  alert and oriented to person, place, and time. Normal mood and affect.          Immunization History   Administered Date(s) Administered    Influenza Vaccine, unspecified formulation 10/12/2016    Influenza Virus Vaccine 10/01/2015, 10/03/2018    Pneumococcal 13-valent Conjugate ([id number]) 07/11/2016    Pneumococcal Polysaccharide ([id number]) 07/22/2009       Allergies   Allergen Reactions    Alendronate Sodium      Stomach upset    Ampicillin     Chlorthalidone      Side effect made sick     Crestor [Rosuvastatin Calcium]     Diclofenac     Lisinopril      cough    Norvasc [Amlodipine Besylate]      Increased heart rate     Zocor [Simvastatin]      Current Outpatient Medications   Medication Sig Dispense Refill    pravastatin (PRAVACHOL) 40 MG tablet TAKE ONE TABLET BY MOUTH ONCE DAILY 90 tablet 1    ALPRAZolam (XANAX) 0.5 MG tablet Take 0.5 tablets by mouth nightly as needed for Sleep for up to 30 days. 15 tablet 0    metoprolol tartrate (LOPRESSOR) 50 MG tablet TAKE 1 TABLET BY MOUTH TWICE DAILY 180 tablet 1    hydrochlorothiazide (MICROZIDE) 12.5 MG capsule Take 1 capsule by mouth daily 90 capsule 1    olmesartan (BENICAR) 40 MG tablet TAKE 1 TABLET BY MOUTH EVERY DAY 90 tablet 1    zoster recombinant adjuvanted vaccine (SHINGRIX) 50 MCG/0.5ML SUSR injection 50 MCG IM then repeat 2-6 months. 0.5 mL 1     No current facility-administered medications for this visit. Past Medical History:   Diagnosis Date    Hair loss     Hyperlipidemia     Hypertension      Past Surgical History:   Procedure Laterality Date    BREAST BIOPSY      left     Family History   Problem Relation Age of Onset    Cancer Sister     Cancer Brother      Social History     Socioeconomic History    Marital status:       Spouse name: Not on file    Number of children: Not on file    Years of education: Not on file    Highest education level: Not on file   Occupational History    Not on file   Social Needs    Financial resource strain: Not on file    Food insecurity:     Worry: Not on file     Inability: Not on file    Transportation needs:     Medical: Not on file     Non-medical: Not on file   Tobacco Use    Smoking status: Former Smoker     Packs/day: 0.50     Last attempt to quit: 1980     Years since quittin.4    Smokeless tobacco: Never Used   Substance and Sexual Activity    Alcohol use: No    Drug use: No    Sexual activity: Not on file   Lifestyle    Physical activity:     Days per week: Not on file     Minutes per session: Not on file    Stress: Not on file   Relationships    Social connections: Talks on phone: Not on file     Gets together: Not on file     Attends Anabaptism service: Not on file     Active member of club or organization: Not on file     Attends meetings of clubs or organizations: Not on file     Relationship status: Not on file    Intimate partner violence:     Fear of current or ex partner: Not on file     Emotionally abused: Not on file     Physically abused: Not on file     Forced sexual activity: Not on file   Other Topics Concern    Not on file   Social History Narrative    Not on file       Assessment/Plan:    1. Essential hypertension  Stable  Metoprolol, HCTZ, Benicar  Monitor blood pressure regularly  Regular exercise and low salt diet      2. Insomnia, unspecified type  Patient uses rarely. - ALPRAZolam (XANAX) 0.5 MG tablet; Take 0.5 tablets by mouth nightly as needed for Sleep for up to 30 days. Dispense: 15 tablet; Refill: 0    3.  Hyperlipidemia, unspecified hyperlipidemia type  Stable  lipitor  Regular exercise and low fat diet

## 2019-09-09 RX ORDER — OLMESARTAN MEDOXOMIL 40 MG/1
TABLET ORAL
Qty: 90 TABLET | Refills: 1 | Status: SHIPPED | OUTPATIENT
Start: 2019-09-09 | End: 2019-11-15 | Stop reason: SDUPTHER

## 2019-11-18 RX ORDER — OLMESARTAN MEDOXOMIL 40 MG/1
40 TABLET ORAL DAILY
Qty: 90 TABLET | Refills: 1 | Status: SHIPPED | OUTPATIENT
Start: 2019-11-18 | End: 2020-01-16 | Stop reason: SDUPTHER

## 2019-11-25 RX ORDER — METOPROLOL TARTRATE 50 MG/1
TABLET, FILM COATED ORAL
Qty: 180 TABLET | Refills: 1 | Status: SHIPPED | OUTPATIENT
Start: 2019-11-25 | End: 2020-01-16 | Stop reason: SDUPTHER

## 2019-12-02 RX ORDER — PRAVASTATIN SODIUM 40 MG
TABLET ORAL
Qty: 90 TABLET | Refills: 0 | Status: SHIPPED | OUTPATIENT
Start: 2019-12-02 | End: 2020-01-16 | Stop reason: SDUPTHER

## 2020-01-16 ENCOUNTER — OFFICE VISIT (OUTPATIENT)
Dept: FAMILY MEDICINE CLINIC | Age: 85
End: 2020-01-16
Payer: COMMERCIAL

## 2020-01-16 VITALS
BODY MASS INDEX: 31.16 KG/M2 | SYSTOLIC BLOOD PRESSURE: 114 MMHG | DIASTOLIC BLOOD PRESSURE: 76 MMHG | HEART RATE: 72 BPM | HEIGHT: 65 IN | OXYGEN SATURATION: 96 % | RESPIRATION RATE: 22 BRPM | TEMPERATURE: 98.2 F | WEIGHT: 187 LBS

## 2020-01-16 DIAGNOSIS — I10 ESSENTIAL HYPERTENSION: ICD-10-CM

## 2020-01-16 DIAGNOSIS — E78.2 MIXED HYPERLIPIDEMIA: ICD-10-CM

## 2020-01-16 DIAGNOSIS — E21.3 HYPERPARATHYROIDISM (HCC): ICD-10-CM

## 2020-01-16 PROBLEM — Z87.39 HISTORY OF GOUT: Status: ACTIVE | Noted: 2020-01-16

## 2020-01-16 PROBLEM — M10.9 ACUTE GOUT OF LEFT ANKLE: Status: RESOLVED | Noted: 2018-06-14 | Resolved: 2020-01-16

## 2020-01-16 LAB
A/G RATIO: 1.2 (ref 1.1–2.2)
ALBUMIN SERPL-MCNC: 4.4 G/DL (ref 3.4–5)
ALP BLD-CCNC: 81 U/L (ref 40–129)
ALT SERPL-CCNC: 9 U/L (ref 10–40)
ANION GAP SERPL CALCULATED.3IONS-SCNC: 19 MMOL/L (ref 3–16)
AST SERPL-CCNC: 12 U/L (ref 15–37)
BILIRUB SERPL-MCNC: 0.4 MG/DL (ref 0–1)
BUN BLDV-MCNC: 21 MG/DL (ref 7–20)
CALCIUM SERPL-MCNC: 11.3 MG/DL (ref 8.3–10.6)
CHLORIDE BLD-SCNC: 98 MMOL/L (ref 99–110)
CHOLESTEROL, TOTAL: 255 MG/DL (ref 0–199)
CO2: 22 MMOL/L (ref 21–32)
CREAT SERPL-MCNC: 1.3 MG/DL (ref 0.6–1.2)
GFR AFRICAN AMERICAN: 47
GFR NON-AFRICAN AMERICAN: 39
GLOBULIN: 3.7 G/DL
GLUCOSE BLD-MCNC: 92 MG/DL (ref 70–99)
HDLC SERPL-MCNC: 61 MG/DL (ref 40–60)
LDL CHOLESTEROL CALCULATED: 164 MG/DL
PARATHYROID HORMONE INTACT: 115.1 PG/ML (ref 14–72)
POTASSIUM SERPL-SCNC: 4.4 MMOL/L (ref 3.5–5.1)
SODIUM BLD-SCNC: 139 MMOL/L (ref 136–145)
TOTAL PROTEIN: 8.1 G/DL (ref 6.4–8.2)
TRIGL SERPL-MCNC: 150 MG/DL (ref 0–150)
VLDLC SERPL CALC-MCNC: 30 MG/DL

## 2020-01-16 PROCEDURE — 99204 OFFICE O/P NEW MOD 45 MIN: CPT | Performed by: FAMILY MEDICINE

## 2020-01-16 RX ORDER — HYDROCHLOROTHIAZIDE 12.5 MG/1
12.5 CAPSULE, GELATIN COATED ORAL DAILY
Qty: 90 CAPSULE | Refills: 1 | Status: SHIPPED | OUTPATIENT
Start: 2020-01-16 | End: 2021-04-15 | Stop reason: SDUPTHER

## 2020-01-16 RX ORDER — LORAZEPAM 0.5 MG/1
0.5 TABLET ORAL DAILY PRN
Qty: 4 TABLET | Refills: 0 | Status: SHIPPED | OUTPATIENT
Start: 2020-01-16 | End: 2020-02-15

## 2020-01-16 RX ORDER — PRAVASTATIN SODIUM 40 MG
TABLET ORAL
Qty: 90 TABLET | Refills: 0 | Status: SHIPPED | OUTPATIENT
Start: 2020-01-16 | End: 2020-05-28

## 2020-01-16 RX ORDER — OLMESARTAN MEDOXOMIL 40 MG/1
40 TABLET ORAL DAILY
Qty: 90 TABLET | Refills: 1 | Status: SHIPPED | OUTPATIENT
Start: 2020-01-16 | End: 2021-04-15 | Stop reason: SDUPTHER

## 2020-01-16 RX ORDER — METOPROLOL TARTRATE 50 MG/1
TABLET, FILM COATED ORAL
Qty: 180 TABLET | Refills: 1 | Status: SHIPPED | OUTPATIENT
Start: 2020-01-16 | End: 2021-04-15 | Stop reason: SDUPTHER

## 2020-01-16 ASSESSMENT — PATIENT HEALTH QUESTIONNAIRE - PHQ9
2. FEELING DOWN, DEPRESSED OR HOPELESS: 0
SUM OF ALL RESPONSES TO PHQ QUESTIONS 1-9: 0
SUM OF ALL RESPONSES TO PHQ QUESTIONS 1-9: 0
SUM OF ALL RESPONSES TO PHQ9 QUESTIONS 1 & 2: 0
1. LITTLE INTEREST OR PLEASURE IN DOING THINGS: 0

## 2020-01-16 NOTE — PROGRESS NOTES
Calcium]     Diclofenac     Lisinopril      cough    Norvasc [Amlodipine Besylate]      Increased heart rate     Zocor [Simvastatin]      Health Maintenance   Topic Date Due    DTaP/Tdap/Td vaccine (1 - Tdap) 09/05/1943    Shingles Vaccine (1 of 2) 09/05/1982    Lipid screen  05/07/2019    Flu vaccine (1) 09/01/2019    Potassium monitoring  11/05/2019    Creatinine monitoring  11/05/2019    Pneumococcal 65+ years Vaccine  Completed      Patient Active Problem List   Diagnosis    Colon polyp    Essential hypertension    Mixed hyperlipidemia    Osteopenia    Hyperparathyroidism ([facility] [address]    History of gout - colchine for flares        LABS:  Lab Results   Component Value Date    GLUCOSE 92 01/16/2020     Lab Results   Component Value Date     01/16/2020    K 4.4 01/16/2020    CREATININE 1.3 (H) 01/16/2020     Cholesterol, Total   Date Value Ref Range Status   01/16/2020 255 (H) 0 - 199 mg/dL Final     LDL Calculated   Date Value Ref Range Status   01/16/2020 164 (H) <100 mg/dL Final     HDL   Date Value Ref Range Status   01/16/2020 61 (H) 40 - 60 mg/dL Final   05/15/2012 65 (H) 40 - 60 mg/dl Final     Triglycerides   Date Value Ref Range Status   01/16/2020 150 0 - 150 mg/dL Final     Lab Results   Component Value Date    ALT 9 (L) 01/16/2020    AST 12 (L) 01/16/2020    ALKPHOS 81 01/16/2020    BILITOT 0.4 01/16/2020      Lab Results   Component Value Date    WBC 6.4 05/07/2018    HGB 13.3 05/07/2018    HCT 40.1 05/07/2018    MCV 86.7 05/07/2018     05/07/2018     TSH (uIU/mL)   Date Value   06/01/2015 0.86     No results found for: LABA1C  No results found for: PSA, PSADIA      PHYSICAL EXAM  /76 (Site: Left Upper Arm, Position: Sitting, Cuff Size: Large Adult)   Pulse 72   Temp 98.2 °F (36.8 °C) (Tympanic)   Resp 22   Ht 5' 5\" (1.651 m)   Wt 187 lb (84.8 kg)   SpO2 96%   BMI 31.12 kg/m²     BP Readings from Last 3 Encounters:   01/16/20 114/76   05/31/19 116/64   11/05/18 (!) 160/62       Wt Readings from Last 3 Encounters:   01/16/20 187 lb (84.8 kg)   05/31/19 196 lb 3.2 oz (89 kg)   11/05/18 195 lb (88.5 kg)        Physical Exam  Constitutional:       Appearance: She is well-developed. HENT:      Head: Normocephalic and atraumatic. Eyes:      Conjunctiva/sclera: Conjunctivae normal.   Neck:      Musculoskeletal: Normal range of motion and neck supple. Thyroid: No thyromegaly. Cardiovascular:      Rate and Rhythm: Normal rate and regular rhythm. Heart sounds: Normal heart sounds. No murmur. Pulmonary:      Effort: Pulmonary effort is normal.      Breath sounds: Normal breath sounds. No wheezing. Abdominal:      General: Bowel sounds are normal.      Palpations: Abdomen is soft. There is no mass. Tenderness: There is no tenderness. Musculoskeletal: Normal range of motion. Lymphadenopathy:      Cervical: No cervical adenopathy. Skin:     General: Skin is warm and dry. Findings: No rash. Comments: Normal turgor   Neurological:      Mental Status: She is alert and oriented to person, place, and time. Deep Tendon Reflexes: Reflexes normal.   Psychiatric:         Thought Content: Thought content normal.       ASSESSMENT/PLAN:  1. Essential hypertension  Well-controlled on current regimen  - COMPREHENSIVE METABOLIC PANEL; Future  - hydrochlorothiazide (MICROZIDE) 12.5 MG capsule; Take 1 capsule by mouth daily  Dispense: 90 capsule; Refill: 1  - metoprolol tartrate (LOPRESSOR) 50 MG tablet; Take one tablet daily  Dispense: 180 tablet; Refill: 1  - olmesartan (BENICAR) 40 MG tablet; Take 1 tablet by mouth daily  Dispense: 90 tablet; Refill: 1    2. Mixed hyperlipidemia  Continue pravastatin and check lipid panel  - pravastatin (PRAVACHOL) 40 MG tablet; TAKE 1 TABLET BY MOUTH EVERY DAY  Dispense: 90 tablet; Refill: 0  - LIPID PANEL; Future    3. History of gout  Not currently on allopurinol. She in the past and use colchicine as needed for flareups. Discussed if labs are more persistent we will consider adding a preventative medication. 4. Hyperparathyroidism ([facility] [address]  Check DEXA, PTH, calcium  - DEXA Bone Density Axial Skeleton; Future  - PTH, INTACT; Future    5. Osteopenia, unspecified location  - DEXA Bone Density Axial Skeleton; Future    6. Fear of flying  These are to be taken before an upcoming flight to [place]  - LORazepam (ATIVAN) 0.5 MG tablet; Take 1 tablet by mouth daily as needed for Anxiety for up to 30 days. Dispense: 4 tablet; Refill: 0      Return in about 6 months (around 7/16/2020) for htn f/u.

## 2020-01-17 ENCOUNTER — TELEPHONE (OUTPATIENT)
Dept: FAMILY MEDICINE CLINIC | Age: 85
End: 2020-01-17

## 2020-01-17 ASSESSMENT — ENCOUNTER SYMPTOMS
COLOR CHANGE: 0
EYE REDNESS: 0
COUGH: 0
DIARRHEA: 0
SHORTNESS OF BREATH: 0
SORE THROAT: 0
VOMITING: 0
NAUSEA: 0
SINUS PRESSURE: 0

## 2020-01-17 NOTE — TELEPHONE ENCOUNTER
Please inform patient that her labs do show high calcium that we discussed in clinic and she will need to see the hormone specialist or endocrinologist.  I placed a referral.  It is not an urgent matter but something that they will be able to help manage. The other notable finding is that her kidney function has decreased some. This can be caused by a number of things such as normal aging, using ibuprofen or other anti-inflammatories often, or dehydration. Advised her to avoid ibuprofen and other anti-inflammatories and we will recheck her kidney function in 2 months to make sure it is stable.   Have ordered the lab and she can get downstairs at her convenience at that time

## 2020-05-28 RX ORDER — PRAVASTATIN SODIUM 40 MG
TABLET ORAL
Qty: 90 TABLET | Refills: 1 | Status: SHIPPED | OUTPATIENT
Start: 2020-05-28 | End: 2020-11-30

## 2020-08-19 ENCOUNTER — OFFICE VISIT (OUTPATIENT)
Dept: FAMILY MEDICINE CLINIC | Age: 85
End: 2020-08-19
Payer: COMMERCIAL

## 2020-08-19 VITALS
HEART RATE: 63 BPM | TEMPERATURE: 97.3 F | DIASTOLIC BLOOD PRESSURE: 70 MMHG | OXYGEN SATURATION: 98 % | WEIGHT: 178 LBS | BODY MASS INDEX: 29.62 KG/M2 | SYSTOLIC BLOOD PRESSURE: 130 MMHG

## 2020-08-19 PROCEDURE — 99214 OFFICE O/P EST MOD 30 MIN: CPT | Performed by: FAMILY MEDICINE

## 2020-08-19 RX ORDER — LORAZEPAM 0.5 MG/1
0.5 TABLET ORAL ONCE
Qty: 1 TABLET | Refills: 0 | Status: SHIPPED | OUTPATIENT
Start: 2020-08-19 | End: 2020-08-19

## 2020-08-19 NOTE — PATIENT INSTRUCTIONS
[facility]  [address], [address] [place] [address]  [place] [address]  Phone: [phone]  Fax: [phone]      [facility]  Address: [address] [place] [address], [place] [address]  Phone: [phone]

## 2020-08-19 NOTE — PROGRESS NOTES
8/19/2020    Ivan Hines is a 80 y.o. female here for follow up  Chief Complaint   Patient presents with    Hypertension     leg and feet pain when she lays down    Other     daughter has memory concerns      HPI  HTN  BP Readings from Last 3 Encounters:   08/19/20 130/70   01/16/20 114/76   05/31/19 116/64     Lab Results   Component Value Date     01/16/2020    K 4.4 01/16/2020    CREATININE 1.3 (H) 01/16/2020   - currently on olmesartan, HCTZ, and metoprolol  - taking medications as prescribed  - denies dizziness or light-headedness  - denies side effects from medications    HLD  Lab Results   Component Value Date    LDLCALC 164 (H) 01/16/2020   - on pravastatin 40mg    Memory  - daughter has concerns about her memory  MMSE:  1/5. Unable to draw clock with numbers in the proper place. Remembered 1/3 words. - doesn't drive (but never did)  - does her own grocery shopping  - does her own finances, but daughter was recently put on all her accounts. - doesn't always remember recent things. For example, made out birthday card for family member this week and then forgot their birthday was this week. Writes herself a lot of reminders. \"sometimes I think I might forget things\"  - no gas appliances, uses electric. Denies issues with leaving things on    Review of Systems  As per HPI, otherwise negative    Prior to Visit Medications    Medication Sig Taking?  Authorizing Provider   pravastatin (PRAVACHOL) 40 MG tablet TAKE 1 TABLET BY MOUTH EVERY DAY Yes Salvador Wallace MD   hydrochlorothiazide (MICROZIDE) 12.5 MG capsule Take 1 capsule by mouth daily Yes Gris Condon MD   metoprolol tartrate (LOPRESSOR) 50 MG tablet Take one tablet daily Yes Gris Condon MD   olmesartan (BENICAR) 40 MG tablet Take 1 tablet by mouth daily Yes Gris Condon MD     Past Medical History:   Diagnosis Date    Gout     Hair loss     Hyperlipidemia     Hypertension      Family History   Problem Relation Age of Onset    Cancer Sister  Lung Cancer Brother        LABS:  Lab Results   Component Value Date     01/16/2020    K 4.4 01/16/2020    CREATININE 1.3 (H) 01/16/2020     Cholesterol, Total   Date Value Ref Range Status   01/16/2020 255 (H) 0 - 199 mg/dL Final     LDL Calculated   Date Value Ref Range Status   01/16/2020 164 (H) <100 mg/dL Final     HDL   Date Value Ref Range Status   01/16/2020 61 (H) 40 - 60 mg/dL Final   05/15/2012 65 (H) 40 - 60 mg/dl Final     Triglycerides   Date Value Ref Range Status   01/16/2020 150 0 - 150 mg/dL Final     Lab Results   Component Value Date    ALT 9 (L) 01/16/2020    AST 12 (L) 01/16/2020    ALKPHOS 81 01/16/2020    BILITOT 0.4 01/16/2020     Lab Results   Component Value Date    WBC 6.4 05/07/2018    HGB 13.3 05/07/2018    HCT 40.1 05/07/2018    MCV 86.7 05/07/2018     05/07/2018     No results found for: LABA1C     PHYSICAL EXAM  /70   Pulse 63   Temp 97.3 °F (36.3 °C)   Wt 178 lb (80.7 kg)   SpO2 98%   BMI 29.62 kg/m²     BP Readings from Last 5 Encounters:   08/19/20 130/70   01/16/20 114/76   05/31/19 116/64   11/05/18 (!) 160/62   06/13/18 (!) 148/67       Wt Readings from Last 5 Encounters:   08/19/20 178 lb (80.7 kg)   01/16/20 187 lb (84.8 kg)   05/31/19 196 lb 3.2 oz (89 kg)   11/05/18 195 lb (88.5 kg)   06/13/18 190 lb 12.8 oz (86.5 kg)      Physical Exam  Constitutional:       Appearance: She is well-developed. HENT:      Head: Normocephalic and atraumatic. Pulmonary:      Effort: Pulmonary effort is normal.   Skin:     Coloration: Skin is not pale. Neurological:      Mental Status: She is alert and oriented to person, place, and time. ASSESSMENT/PLAN:  1. Essential hypertension  Well controlled on current regimen. No side effects from medications. Advise low salt diet and routine exercise  - BASIC METABOLIC PANEL; Future    2. Osteopenia, unspecified location  DEXA has been ordered    3. Mixed hyperlipidemia  Continue statin.  Will recheck levels toward end of year. I ensured she has this at home because her recent LDL was surprisingly high  - BASIC METABOLIC PANEL; Future    4. Hyperparathyroidism ([address]  Needs to see Endo. Referral has been placed  - BASIC METABOLIC PANEL; Future    5. Memory problem  1/5 on MMSE. Check labs. Doesn't drive. Has family who checks on her frequently, no imminent safety concerns. MRI for further eval. F/u for more formal MOCA memory testing in 2 months after labs and MRI  - BASIC METABOLIC PANEL; Future  - Folate; Future  - TSH without Reflex; Future  - Vitamin B12; Future  - Vitamin D 25 Hydroxy; Future  - CBC Auto Differential; Future  - MRI BRAIN WO CONTRAST;  Future      F/u 2 months for memory testing

## 2020-09-03 ENCOUNTER — TELEPHONE (OUTPATIENT)
Dept: FAMILY MEDICINE CLINIC | Age: 85
End: 2020-09-03

## 2020-09-03 NOTE — TELEPHONE ENCOUNTER
Thanks for giving them info for COA. Agree, happy to provide them the documentation. We were starting a dementia workup per our last office visit note.

## 2020-09-03 NOTE — TELEPHONE ENCOUNTER
Virgie    Called and spoke to Shanna thompson. The situation is worse than they were aware of. Pt has not allowed anyone in the home for a year. She has a lawson but Mom has multiple doors locked. Turns out she has not been paying health Insurance and Medicare has lapsed. She has no medical or drug coverage at all. Called Medicare and was told she has to wait for reenrollment and will be wo coverage until April. They contacted social Security and was told the the only way to overturn this is if a statement of claimant form is completed and approved. This needs to state that the reason for nonpayment  Was not intentional and resulted from memory issue. Once this is filed the [address] [place] [address] office may reach out to us for medical records or a statement supporting this claim. I assured Rody that Dr Wallace did make notes expressing concern over pts memory. She is upset that testing will need to be put on hold for now. I gave her number for [place] [facility] and encouraged her to call them for additional support.

## 2020-09-03 NOTE — TELEPHONE ENCOUNTER
----- Message from Toma Penaloza sent at 9/3/2020 11:48 AM EDT -----  Subject: Message to Provider    QUESTIONS  Information for Provider? Robert Pagan calling to f/u. Concerned about   tests that have been ordered and paperwork that needs to be filled out to   reinstate medicare. Needs guidance on what to put on paperwork. Cannot   proceed with tests. Requesting call back as soon as poss.  ---------------------------------------------------------------------------  --------------  [address]  What is the best way for the office to contact you? OK to leave message on   voicemail  Preferred Call Back Phone Number? [phone]  ---------------------------------------------------------------------------  --------------  SCRIPT ANSWERS  Relationship to Patient? Other  Representative Name? Faby fontenot   Is the Representative on the appropriate HIPAA document in Epic?  Yes

## 2020-11-30 RX ORDER — PRAVASTATIN SODIUM 40 MG
TABLET ORAL
Qty: 90 TABLET | Refills: 1 | Status: SHIPPED | OUTPATIENT
Start: 2020-11-30 | End: 2021-04-15 | Stop reason: SDUPTHER

## 2020-12-01 ENCOUNTER — TELEPHONE (OUTPATIENT)
Dept: FAMILY MEDICINE CLINIC | Age: 85
End: 2020-12-01

## 2021-03-10 ENCOUNTER — TELEPHONE (OUTPATIENT)
Dept: FAMILY MEDICINE CLINIC | Age: 86
End: 2021-03-10

## 2021-03-10 NOTE — TELEPHONE ENCOUNTER
I called the pt to schedule a AWV the daughter called has some concerns stated the pt is not paying bills or remembering anything just a FYI will discuss at appointment per the daughter

## 2021-04-15 ENCOUNTER — OFFICE VISIT (OUTPATIENT)
Dept: FAMILY MEDICINE CLINIC | Age: 86
End: 2021-04-15

## 2021-04-15 VITALS
TEMPERATURE: 98.3 F | RESPIRATION RATE: 16 BRPM | OXYGEN SATURATION: 98 % | HEART RATE: 72 BPM | HEIGHT: 65 IN | DIASTOLIC BLOOD PRESSURE: 82 MMHG | BODY MASS INDEX: 29.99 KG/M2 | SYSTOLIC BLOOD PRESSURE: 160 MMHG | WEIGHT: 180 LBS

## 2021-04-15 DIAGNOSIS — R41.3 MEMORY PROBLEM: Primary | ICD-10-CM

## 2021-04-15 DIAGNOSIS — Z78.9 DECREASED ACTIVITIES OF DAILY LIVING (ADL): ICD-10-CM

## 2021-04-15 DIAGNOSIS — I10 ESSENTIAL HYPERTENSION: ICD-10-CM

## 2021-04-15 DIAGNOSIS — E78.2 MIXED HYPERLIPIDEMIA: ICD-10-CM

## 2021-04-15 DIAGNOSIS — E21.3 HYPERPARATHYROIDISM (HCC): ICD-10-CM

## 2021-04-15 PROCEDURE — 4040F PNEUMOC VAC/ADMIN/RCVD: CPT | Performed by: FAMILY MEDICINE

## 2021-04-15 PROCEDURE — 99214 OFFICE O/P EST MOD 30 MIN: CPT | Performed by: FAMILY MEDICINE

## 2021-04-15 PROCEDURE — G8417 CALC BMI ABV UP PARAM F/U: HCPCS | Performed by: FAMILY MEDICINE

## 2021-04-15 PROCEDURE — 1036F TOBACCO NON-USER: CPT | Performed by: FAMILY MEDICINE

## 2021-04-15 PROCEDURE — 1123F ACP DISCUSS/DSCN MKR DOCD: CPT | Performed by: FAMILY MEDICINE

## 2021-04-15 PROCEDURE — G8427 DOCREV CUR MEDS BY ELIG CLIN: HCPCS | Performed by: FAMILY MEDICINE

## 2021-04-15 PROCEDURE — 1090F PRES/ABSN URINE INCON ASSESS: CPT | Performed by: FAMILY MEDICINE

## 2021-04-15 RX ORDER — HYDROCHLOROTHIAZIDE 12.5 MG/1
12.5 CAPSULE, GELATIN COATED ORAL DAILY
Qty: 90 CAPSULE | Refills: 1 | Status: SHIPPED | OUTPATIENT
Start: 2021-04-15

## 2021-04-15 RX ORDER — PRAVASTATIN SODIUM 40 MG
TABLET ORAL
Qty: 90 TABLET | Refills: 1 | Status: SHIPPED | OUTPATIENT
Start: 2021-04-15

## 2021-04-15 RX ORDER — METOPROLOL TARTRATE 50 MG/1
TABLET, FILM COATED ORAL
Qty: 180 TABLET | Refills: 1 | Status: SHIPPED | OUTPATIENT
Start: 2021-04-15 | End: 2021-08-03

## 2021-04-15 RX ORDER — OLMESARTAN MEDOXOMIL 40 MG/1
40 TABLET ORAL DAILY
Qty: 90 TABLET | Refills: 1 | Status: SHIPPED | OUTPATIENT
Start: 2021-04-15

## 2021-04-15 ASSESSMENT — PATIENT HEALTH QUESTIONNAIRE - PHQ9
SUM OF ALL RESPONSES TO PHQ9 QUESTIONS 1 & 2: 2
SUM OF ALL RESPONSES TO PHQ QUESTIONS 1-9: 2
1. LITTLE INTEREST OR PLEASURE IN DOING THINGS: 1

## 2021-04-15 NOTE — PROGRESS NOTES
4/15/2021    This is a 80 y.o. female   Chief Complaint   Patient presents with    Other     DOP here with patient to discuss concerns. Patient has not been bathing, taking medications or paying bills. She has lost her Medicare and it will go back into place in July. She has not been paying any bills. SHe refuses to let famly into her house to assist her. House is very dirty and cluttered. DOP feels patient should not be living alone and not sure what to do     HPI    Patient has no concerns herslef today    Daughter is here with pt and would like to discuss concerns. Bharath Benitez lives by herself. She does not drive. Daughter takes care of bills. She prepares her own meals, but struggles to keep up with the cleaning. Daughter reports everything is cluttered and messy, that it is dirty and there are dishes everywhere. - typically eats fast food, [employer]  - daughter reports a couple of times when she was cooking and left the stove on and burned the food  - she reports being out of her medications. She typically manages these herself  - daughter has concerns that she is not bathing regularly  - lost her Medicare as she forgot to pay her bills    We discussed memory at length last visit. She scored a 1/5 on MMSE. Labs and MRI were ordered she has not been able to obtain these as of yet.       Review of Systems     Past Medical History:   Diagnosis Date    Gout     Hair loss     Hyperlipidemia     Hypertension        Past Surgical History:   Procedure Laterality Date    BREAST LUMPECTOMY         Family History   Problem Relation Age of Onset    Cancer Sister     Lung Cancer Brother        Current Outpatient Medications   Medication Sig Dispense Refill    pravastatin (PRAVACHOL) 40 MG tablet TAKE 1 TABLET BY MOUTH EVERY DAY 90 tablet 1    hydroCHLOROthiazide (MICROZIDE) 12.5 MG capsule Take 1 capsule by mouth daily 90 capsule 1    metoprolol tartrate (LOPRESSOR) 50 MG tablet Take one tablet daily 180 tablet 1  olmesartan (BENICAR) 40 MG tablet Take 1 tablet by mouth daily 90 tablet 1     No current facility-administered medications for this visit. BP (!) 160/82 (Site: Right Upper Arm, Position: Sitting, Cuff Size: Medium Adult)   Pulse 72   Temp 98.3 °F (36.8 °C) (Oral)   Resp 16   Ht 5' 5\" (1.651 m)   Wt 180 lb (81.6 kg)   SpO2 98%   BMI 29.95 kg/m²     Physical Exam    Wt Readings from Last 3 Encounters:   04/15/21 180 lb (81.6 kg)   08/19/20 178 lb (80.7 kg)   01/16/20 187 lb (84.8 kg)     BP Readings from Last 3 Encounters:   04/15/21 (!) 160/82   08/19/20 130/70   01/16/20 114/76     Assessment/Plan:  1. Memory problem  - External Referral To Home Health    2. Decreased activities of daily living (ADL)  - External Referral To Home Health    3. Mixed hyperlipidemia  - pravastatin (PRAVACHOL) 40 MG tablet; TAKE 1 TABLET BY MOUTH EVERY DAY  Dispense: 90 tablet; Refill: 1    4. Essential hypertension  - hydroCHLOROthiazide (MICROZIDE) 12.5 MG capsule; Take 1 capsule by mouth daily  Dispense: 90 capsule; Refill: 1  - metoprolol tartrate (LOPRESSOR) 50 MG tablet; Take one tablet daily  Dispense: 180 tablet; Refill: 1  - olmesartan (BENICAR) 40 MG tablet; Take 1 tablet by mouth daily  Dispense: 90 tablet; Refill: 1    5. Hyperparathyroidism [facility])    We had a prolonged conversation about concerns for Nuvia Benjamin and her safety at home and ability to function well on her own. She understandably is pretty resistant to having an intervention or help at this time. We reviewed potential's including getting home health involved versus allowing more family and friends to come in and help. Her daughter showed me some photos and her house is in quite a bit of disarray. She will follow up closely in 2 weeks for a blood pressure check. She is asked to have that time in order to come up with the best plan moving forward, whether that will involve home health or assisted living. Her daughter is in agreement.     Return in about 2 weeks (around 4/29/2021) for htn f/u.

## 2021-04-29 ENCOUNTER — OFFICE VISIT (OUTPATIENT)
Dept: FAMILY MEDICINE CLINIC | Age: 86
End: 2021-04-29

## 2021-04-29 VITALS
SYSTOLIC BLOOD PRESSURE: 180 MMHG | OXYGEN SATURATION: 98 % | WEIGHT: 181 LBS | RESPIRATION RATE: 12 BRPM | BODY MASS INDEX: 30.12 KG/M2 | HEART RATE: 63 BPM | DIASTOLIC BLOOD PRESSURE: 80 MMHG

## 2021-04-29 DIAGNOSIS — F03.90 DEMENTIA WITHOUT BEHAVIORAL DISTURBANCE, UNSPECIFIED DEMENTIA TYPE: Primary | ICD-10-CM

## 2021-04-29 DIAGNOSIS — I10 ESSENTIAL HYPERTENSION: ICD-10-CM

## 2021-04-29 PROCEDURE — 99214 OFFICE O/P EST MOD 30 MIN: CPT | Performed by: FAMILY MEDICINE

## 2021-04-29 NOTE — PROGRESS NOTES
4/29/2021    This is a 80 y.o. female   Chief Complaint   Patient presents with    Hypertension     HPI    Here for follow up  - here with her daughter and son  - they note she has had worsening memory. She often forgets things that happened that day. She is having trouble recalling our office visit a couple of weeks ago  - Children have concerns because she forgot to play bills, has trouble keeping her home clean and free of clutter, occasionally leaves the stove on, but she is reluctant to let them help  - often forgets to take medications especially for blood pressure    BP Readings from Last 3 Encounters:   04/29/21 (!) 180/80   04/15/21 (!) 160/82   08/19/20 130/70           Review of Systems     Past Medical History:   Diagnosis Date    Gout     Hair loss     Hyperlipidemia     Hypertension        Past Surgical History:   Procedure Laterality Date    BREAST LUMPECTOMY         Family History   Problem Relation Age of Onset    Cancer Sister     Lung Cancer Brother        Current Outpatient Medications   Medication Sig Dispense Refill    pravastatin (PRAVACHOL) 40 MG tablet TAKE 1 TABLET BY MOUTH EVERY DAY 90 tablet 1    hydroCHLOROthiazide (MICROZIDE) 12.5 MG capsule Take 1 capsule by mouth daily 90 capsule 1    metoprolol tartrate (LOPRESSOR) 50 MG tablet Take one tablet daily 180 tablet 1    olmesartan (BENICAR) 40 MG tablet Take 1 tablet by mouth daily 90 tablet 1     No current facility-administered medications for this visit. BP (!) 180/80   Pulse 63   Resp 12   Wt 181 lb (82.1 kg)   SpO2 98%   BMI 30.12 kg/m²     Physical Exam    Wt Readings from Last 3 Encounters:   04/29/21 181 lb (82.1 kg)   04/15/21 180 lb (81.6 kg)   08/19/20 178 lb (80.7 kg)       BP Readings from Last 3 Encounters:   04/29/21 (!) 180/80   04/15/21 (!) 160/82   08/19/20 130/70         Assessment/Plan:  1. Dementia without behavioral disturbance, unspecified dementia type ([facility] [address]    2.  Essential hypertension Has old labs ordered to rule out secondary causes, but she meets criteria for dementia with her change in cognition that effect her ADLs significantly. We discussed this at length, together and with her son and daughter present today. She has some reluctance to allow them to help and is a little overwhelmed discussing this. We discussed given recent progression, that living either with family or in assisted living facility would be ideal and her family would like this as well. She agrees to pursue this. Her daughter has connections through her work and will start looking into places. We discussed setting up safety parameters to ensure she is able to take meds regularly and not have any concerning fire safety issues at home.

## 2021-04-30 PROBLEM — F03.90 DEMENTIA WITHOUT BEHAVIORAL DISTURBANCE (HCC): Status: ACTIVE | Noted: 2021-04-30

## 2021-08-02 DIAGNOSIS — I10 ESSENTIAL HYPERTENSION: ICD-10-CM

## 2021-08-03 RX ORDER — METOPROLOL TARTRATE 50 MG/1
TABLET, FILM COATED ORAL
Qty: 180 TABLET | Refills: 1 | Status: SHIPPED | OUTPATIENT
Start: 2021-08-03

## 2021-12-06 ENCOUNTER — TELEPHONE (OUTPATIENT)
Dept: FAMILY MEDICINE CLINIC | Age: 86
End: 2021-12-06

## 2021-12-06 DIAGNOSIS — F03.91 DEMENTIA WITH BEHAVIORAL DISTURBANCE, UNSPECIFIED DEMENTIA TYPE: Primary | ICD-10-CM

## 2021-12-06 NOTE — TELEPHONE ENCOUNTER
I ordered home health with home safety evaluation. Also, 2 resources for daughter to contact:     Adult Protective Services: [phone] ([phone])    Mobile Crisis Team (MCT)   [phone]  MCT is a mobile clinical team trained to respond to mental health emergencies in the community. Mobile Crisis is available Monday through Friday from 8:30 am to 12:00 am and Saturday and Sunday from 11:30 am to 7:30 pm.  After hour referrals can be made through [facility] ([facility]). Call [phone]. [facility] will page the Mobile Crisis Team if necessary.

## 2021-12-06 NOTE — TELEPHONE ENCOUNTER
DOP CALLED IN STATED THE PT DID NOT WANT TO GO INTO A FACILITY SHE WANTED TO LET DR SALCEDO KNOW SHE IS REFUSED TO GO LAST NIGHT THE PT LEFT THE WATER ON FLOODED THE HOUSE WHEN DAUGHTER COMES TO PICK HER UP SHE WILL NOT LET HER IN THE HOUSE IS FILTHY STUFF ALL OVER THERE ARE NATS ALL OVER THE HOUSE THE PT IS BUYING LOTS OF STUFF SHE IS BEHAVING LIKE THERE IS NOTHING WRONG DAUGHTER IS VERY CONCERNED     DAUGHTER OF PT IS UPSET SHE WAS TOLD TO CALL DR SALCEDO BECAUSE THEY MAY NEED TO SEND SOMEONE TO GET HER ADVISED DR SALCEDO IS OUT OF THE OFFICE THIS WEEK

## 2021-12-06 NOTE — TELEPHONE ENCOUNTER
Called and spoke to DOP   DOP said she will take into consideration of what Dr.Lenz BORGES would feel more comfortable talking with  because he knows everything going on with pt   Thank you

## 2021-12-15 NOTE — TELEPHONE ENCOUNTER
Called [phone] and spoke to a report line  They stated they will write a report up and contact adult protective services

## 2021-12-15 NOTE — TELEPHONE ENCOUNTER
Called an spoke to [address] [place] [address] she does not agree with plan and will not be calling protective services   Per  called and reported to county what the DOP has stated of living conditions  of pt

## 2021-12-15 NOTE — TELEPHONE ENCOUNTER
I agree fully with Dr. Amelia Matthews - needs in home evaluation and to contact Adult Protective Services to ensure her home environment is safe - it can help her have medical reasons to move out if needed.

## 2022-04-13 ENCOUNTER — TELEPHONE (OUTPATIENT)
Dept: FAMILY MEDICINE CLINIC | Age: 87
End: 2022-04-13